# Patient Record
Sex: FEMALE | Race: WHITE | NOT HISPANIC OR LATINO | Employment: PART TIME | URBAN - NONMETROPOLITAN AREA
[De-identification: names, ages, dates, MRNs, and addresses within clinical notes are randomized per-mention and may not be internally consistent; named-entity substitution may affect disease eponyms.]

---

## 2024-03-31 ENCOUNTER — APPOINTMENT (EMERGENCY)
Dept: CT IMAGING | Facility: HOSPITAL | Age: 54
End: 2024-03-31
Payer: COMMERCIAL

## 2024-03-31 ENCOUNTER — HOSPITAL ENCOUNTER (EMERGENCY)
Facility: HOSPITAL | Age: 54
Discharge: HOME | End: 2024-03-31
Attending: EMERGENCY MEDICINE | Admitting: EMERGENCY MEDICINE
Payer: COMMERCIAL

## 2024-03-31 VITALS
HEART RATE: 93 BPM | RESPIRATION RATE: 16 BRPM | HEIGHT: 60 IN | OXYGEN SATURATION: 100 % | TEMPERATURE: 98.4 F | WEIGHT: 255 LBS | DIASTOLIC BLOOD PRESSURE: 64 MMHG | SYSTOLIC BLOOD PRESSURE: 142 MMHG | BODY MASS INDEX: 50.06 KG/M2

## 2024-03-31 DIAGNOSIS — N20.0 RENAL CALCULI: ICD-10-CM

## 2024-03-31 DIAGNOSIS — N39.0 UTI (URINARY TRACT INFECTION): Primary | ICD-10-CM

## 2024-03-31 LAB
ALBUMIN SERPL BCP-MCNC: 3.9 G/DL (ref 3.5–5)
ALP SERPL-CCNC: 42 U/L (ref 34–104)
ALT SERPL W P-5'-P-CCNC: 14 U/L (ref 7–52)
AMORPH PHOS CRY URNS QL MICRO: ABNORMAL /HPF
ANION GAP SERPL CALCULATED.3IONS-SCNC: 7 MMOL/L (ref 4–13)
AST SERPL W P-5'-P-CCNC: 12 U/L (ref 13–39)
BACTERIA UR QL AUTO: ABNORMAL /HPF
BASOPHILS # BLD AUTO: 0.03 THOUSANDS/ÂΜL (ref 0–0.1)
BASOPHILS NFR BLD AUTO: 0 % (ref 0–1)
BILIRUB SERPL-MCNC: 0.43 MG/DL (ref 0.2–1)
BILIRUB UR QL STRIP: ABNORMAL
BUN SERPL-MCNC: 16 MG/DL (ref 5–25)
CALCIUM SERPL-MCNC: 9.2 MG/DL (ref 8.4–10.2)
CHLORIDE SERPL-SCNC: 102 MMOL/L (ref 96–108)
CLARITY UR: ABNORMAL
CO2 SERPL-SCNC: 28 MMOL/L (ref 21–32)
COLOR UR: ABNORMAL
CREAT SERPL-MCNC: 0.65 MG/DL (ref 0.6–1.3)
EOSINOPHIL # BLD AUTO: 0.23 THOUSAND/ÂΜL (ref 0–0.61)
EOSINOPHIL NFR BLD AUTO: 3 % (ref 0–6)
ERYTHROCYTE [DISTWIDTH] IN BLOOD BY AUTOMATED COUNT: 13.5 % (ref 11.6–15.1)
GFR SERPL CREATININE-BSD FRML MDRD: 99 ML/MIN/1.73SQ M
GLUCOSE SERPL-MCNC: 98 MG/DL (ref 65–140)
GLUCOSE UR STRIP-MCNC: NEGATIVE MG/DL
HCT VFR BLD AUTO: 41.8 % (ref 34.8–46.1)
HGB BLD-MCNC: 14 G/DL (ref 11.5–15.4)
HGB UR QL STRIP.AUTO: ABNORMAL
IMM GRANULOCYTES # BLD AUTO: 0.04 THOUSAND/UL (ref 0–0.2)
IMM GRANULOCYTES NFR BLD AUTO: 1 % (ref 0–2)
KETONES UR STRIP-MCNC: NEGATIVE MG/DL
LEUKOCYTE ESTERASE UR QL STRIP: NEGATIVE
LIPASE SERPL-CCNC: 46 U/L (ref 11–82)
LYMPHOCYTES # BLD AUTO: 2.7 THOUSANDS/ÂΜL (ref 0.6–4.47)
LYMPHOCYTES NFR BLD AUTO: 31 % (ref 14–44)
MCH RBC QN AUTO: 30.2 PG (ref 26.8–34.3)
MCHC RBC AUTO-ENTMCNC: 33.5 G/DL (ref 31.4–37.4)
MCV RBC AUTO: 90 FL (ref 82–98)
MONOCYTES # BLD AUTO: 0.76 THOUSAND/ÂΜL (ref 0.17–1.22)
MONOCYTES NFR BLD AUTO: 9 % (ref 4–12)
NEUTROPHILS # BLD AUTO: 4.9 THOUSANDS/ÂΜL (ref 1.85–7.62)
NEUTS SEG NFR BLD AUTO: 56 % (ref 43–75)
NITRITE UR QL STRIP: POSITIVE
NON-SQ EPI CELLS URNS QL MICRO: ABNORMAL /HPF
NRBC BLD AUTO-RTO: 0 /100 WBCS
PH UR STRIP.AUTO: 6.5 [PH]
PLATELET # BLD AUTO: 266 THOUSANDS/UL (ref 149–390)
PMV BLD AUTO: 9 FL (ref 8.9–12.7)
POTASSIUM SERPL-SCNC: 4.3 MMOL/L (ref 3.5–5.3)
PROT SERPL-MCNC: 6.8 G/DL (ref 6.4–8.4)
PROT UR STRIP-MCNC: ABNORMAL MG/DL
RBC # BLD AUTO: 4.63 MILLION/UL (ref 3.81–5.12)
RBC #/AREA URNS AUTO: ABNORMAL /HPF
SODIUM SERPL-SCNC: 137 MMOL/L (ref 135–147)
SP GR UR STRIP.AUTO: >=1.03 (ref 1–1.03)
UROBILINOGEN UR QL STRIP.AUTO: 1 E.U./DL
WBC # BLD AUTO: 8.66 THOUSAND/UL (ref 4.31–10.16)
WBC #/AREA URNS AUTO: ABNORMAL /HPF

## 2024-03-31 PROCEDURE — 80053 COMPREHEN METABOLIC PANEL: CPT | Performed by: EMERGENCY MEDICINE

## 2024-03-31 PROCEDURE — 96366 THER/PROPH/DIAG IV INF ADDON: CPT

## 2024-03-31 PROCEDURE — 99284 EMERGENCY DEPT VISIT MOD MDM: CPT

## 2024-03-31 PROCEDURE — 81001 URINALYSIS AUTO W/SCOPE: CPT | Performed by: EMERGENCY MEDICINE

## 2024-03-31 PROCEDURE — 96365 THER/PROPH/DIAG IV INF INIT: CPT

## 2024-03-31 PROCEDURE — 36415 COLL VENOUS BLD VENIPUNCTURE: CPT | Performed by: EMERGENCY MEDICINE

## 2024-03-31 PROCEDURE — 83690 ASSAY OF LIPASE: CPT | Performed by: EMERGENCY MEDICINE

## 2024-03-31 PROCEDURE — 74176 CT ABD & PELVIS W/O CONTRAST: CPT

## 2024-03-31 PROCEDURE — 96375 TX/PRO/DX INJ NEW DRUG ADDON: CPT

## 2024-03-31 PROCEDURE — 85025 COMPLETE CBC W/AUTO DIFF WBC: CPT | Performed by: EMERGENCY MEDICINE

## 2024-03-31 PROCEDURE — 99285 EMERGENCY DEPT VISIT HI MDM: CPT | Performed by: EMERGENCY MEDICINE

## 2024-03-31 PROCEDURE — 87086 URINE CULTURE/COLONY COUNT: CPT | Performed by: EMERGENCY MEDICINE

## 2024-03-31 RX ORDER — CIPROFLOXACIN 500 MG/1
500 TABLET, FILM COATED ORAL 2 TIMES DAILY
Qty: 14 TABLET | Refills: 0 | Status: SHIPPED | OUTPATIENT
Start: 2024-03-31 | End: 2024-04-07

## 2024-03-31 RX ORDER — CIPROFLOXACIN 500 MG/1
500 TABLET, FILM COATED ORAL ONCE
Status: COMPLETED | OUTPATIENT
Start: 2024-03-31 | End: 2024-03-31

## 2024-03-31 RX ORDER — FENTANYL CITRATE 50 UG/ML
50 INJECTION, SOLUTION INTRAMUSCULAR; INTRAVENOUS ONCE
Status: COMPLETED | OUTPATIENT
Start: 2024-03-31 | End: 2024-03-31

## 2024-03-31 RX ADMIN — CIPROFLOXACIN 500 MG: 500 TABLET, FILM COATED ORAL at 03:25

## 2024-03-31 RX ADMIN — SODIUM CHLORIDE, SODIUM LACTATE, POTASSIUM CHLORIDE, AND CALCIUM CHLORIDE 1000 ML: .6; .31; .03; .02 INJECTION, SOLUTION INTRAVENOUS at 01:00

## 2024-03-31 RX ADMIN — FENTANYL CITRATE 50 MCG: 50 INJECTION INTRAMUSCULAR; INTRAVENOUS at 00:57

## 2024-03-31 NOTE — ED PROVIDER NOTES
History  Chief Complaint   Patient presents with    Blood in Urine     Blood in urine. Pt denies pain       History provided by:  Medical records and patient  Blood in Urine  This is a new problem. The current episode started today. The problem is unchanged. She describes the hematuria as gross hematuria. The hematuria occurs throughout her entire urinary stream. She reports no clotting in her urine stream. Her pain is at a severity of 3/10. The pain is mild. She describes her urine color as tea-colored. Irritative symptoms do not include frequency, nocturia or urgency. Associated symptoms include abdominal pain, bladder pain and flank pain. Pertinent negatives include no bone pain, chills, dysuria, fever, genital pain, hesitancy, inability to urinate, nausea, urinary retention, vomiting or weight loss. She is not sexually active.       None       Past Medical History:   Diagnosis Date    Asthma     Hypertension        History reviewed. No pertinent surgical history.    History reviewed. No pertinent family history.  I have reviewed and agree with the history as documented.    E-Cigarette/Vaping     E-Cigarette/Vaping Substances     Social History     Tobacco Use    Smoking status: Never    Smokeless tobacco: Never   Substance Use Topics    Alcohol use: Never    Drug use: Never       Review of Systems   Constitutional:  Negative for chills, fatigue, fever and weight loss.   HENT:  Negative for ear discharge, ear pain, rhinorrhea and sore throat.    Eyes:  Negative for pain and visual disturbance.   Respiratory:  Negative for cough and shortness of breath.    Cardiovascular:  Negative for chest pain and palpitations.   Gastrointestinal:  Positive for abdominal pain. Negative for diarrhea, nausea and vomiting.   Endocrine: Negative for polydipsia, polyphagia and polyuria.   Genitourinary:  Positive for difficulty urinating, flank pain and hematuria. Negative for decreased urine volume, dyspareunia, dysuria, enuresis,  frequency, genital sores, hesitancy, menstrual problem, nocturia, pelvic pain, urgency, vaginal bleeding, vaginal discharge and vaginal pain.   Musculoskeletal:  Negative for arthralgias and back pain.   Skin:  Negative for color change and rash.   Allergic/Immunologic: Negative for immunocompromised state.   Neurological:  Negative for dizziness, seizures, syncope, weakness and headaches.   Psychiatric/Behavioral:  Negative for confusion and self-injury. The patient is not nervous/anxious.    All other systems reviewed and are negative.      Physical Exam  Physical Exam  Vitals and nursing note reviewed.   Constitutional:       General: She is not in acute distress.     Appearance: Normal appearance. She is not ill-appearing, toxic-appearing or diaphoretic.   HENT:      Head: Normocephalic and atraumatic.      Nose: Nose normal. No congestion or rhinorrhea.      Mouth/Throat:      Mouth: Mucous membranes are moist.      Pharynx: Oropharynx is clear. No oropharyngeal exudate or posterior oropharyngeal erythema.   Eyes:      General:         Right eye: No discharge.         Left eye: No discharge.   Cardiovascular:      Rate and Rhythm: Normal rate and regular rhythm.      Pulses: Normal pulses.      Heart sounds: Normal heart sounds. No murmur heard.     No gallop.   Pulmonary:      Effort: Pulmonary effort is normal. No respiratory distress.      Breath sounds: Normal breath sounds. No stridor. No wheezing, rhonchi or rales.   Chest:      Chest wall: No tenderness.   Abdominal:      General: Bowel sounds are normal. There is no distension.      Palpations: Abdomen is soft. There is no mass.      Tenderness: There is no abdominal tenderness. There is right CVA tenderness and left CVA tenderness. There is no guarding or rebound.      Hernia: No hernia is present.   Musculoskeletal:         General: Normal range of motion.      Cervical back: Normal range of motion and neck supple.   Skin:     General: Skin is warm  and dry.      Capillary Refill: Capillary refill takes less than 2 seconds.   Neurological:      General: No focal deficit present.      Mental Status: She is alert and oriented to person, place, and time.      Cranial Nerves: No cranial nerve deficit.      Sensory: No sensory deficit.      Motor: No weakness.      Coordination: Coordination normal.      Gait: Gait normal.      Deep Tendon Reflexes: Reflexes normal.   Psychiatric:         Mood and Affect: Mood normal.         Behavior: Behavior normal.         Thought Content: Thought content normal.         Judgment: Judgment normal.         Vital Signs  ED Triage Vitals [03/31/24 0033]   Temperature Pulse Respirations Blood Pressure SpO2   98.4 °F (36.9 °C) 82 16 141/67 98 %      Temp Source Heart Rate Source Patient Position - Orthostatic VS BP Location FiO2 (%)   Temporal Monitor Sitting Left arm --      Pain Score       No Pain           Vitals:    03/31/24 0033 03/31/24 0100 03/31/24 0200 03/31/24 0322   BP: 141/67 127/59 142/64 142/64   Pulse: 82  75 93   Patient Position - Orthostatic VS: Sitting            Visual Acuity      ED Medications  Medications   lactated ringers bolus 1,000 mL (0 mL Intravenous Stopped 3/31/24 0324)   fentaNYL injection 50 mcg (50 mcg Intravenous Given 3/31/24 0057)   ciprofloxacin (CIPRO) tablet 500 mg (500 mg Oral Given 3/31/24 0325)       Diagnostic Studies  Results Reviewed       Procedure Component Value Units Date/Time    Comprehensive metabolic panel [[id number]]  (Abnormal) Collected: 03/31/24 0059    Lab Status: Final result Specimen: Blood from Arm, Left Updated: 03/31/24 0125     Sodium 137 mmol/L      Potassium 4.3 mmol/L      Chloride 102 mmol/L      CO2 28 mmol/L      ANION GAP 7 mmol/L      BUN 16 mg/dL      Creatinine 0.65 mg/dL      Glucose 98 mg/dL      Calcium 9.2 mg/dL      AST 12 U/L      ALT 14 U/L      Alkaline Phosphatase 42 U/L      Total Protein 6.8 g/dL      Albumin 3.9 g/dL      Total Bilirubin 0.43 mg/dL       eGFR 99 ml/min/1.73sq m     Narrative:      National Kidney Disease Foundation guidelines for Chronic Kidney Disease (CKD):     Stage 1 with normal or high GFR (GFR > 90 mL/min/1.73 square meters)    Stage 2 Mild CKD (GFR = 60-89 mL/min/1.73 square meters)    Stage 3A Moderate CKD (GFR = 45-59 mL/min/1.73 square meters)    Stage 3B Moderate CKD (GFR = 30-44 mL/min/1.73 square meters)    Stage 4 Severe CKD (GFR = 15-29 mL/min/1.73 square meters)    Stage 5 End Stage CKD (GFR <15 mL/min/1.73 square meters)  Note: GFR calculation is accurate only with a steady state creatinine    Lipase [[id number]]  (Normal) Collected: 03/31/24 0059    Lab Status: Final result Specimen: Blood from Arm, Left Updated: 03/31/24 0125     Lipase 46 u/L     Urine Microscopic [[id number]]  (Abnormal) Collected: 03/31/24 0102    Lab Status: Final result Specimen: Urine, Clean Catch Updated: 03/31/24 0116     RBC, UA Innumerable /hpf      WBC, UA       Field obscured, unable to enumerate     /hpf     Epithelial Cells       Field obscured, unable to enumerate     /hpf     Bacteria, UA       Field obscured, unable to enumerate     /hpf     AMORPH PHOSPATES Moderate /hpf     Urine culture [[id number]] Collected: 03/31/24 0102    Lab Status: In process Specimen: Urine, Clean Catch Updated: 03/31/24 0115    UA w Reflex to Microscopic w Reflex to Culture [[id number]]  (Abnormal) Collected: 03/31/24 0102    Lab Status: Final result Specimen: Urine, Clean Catch Updated: 03/31/24 0109     Color, UA Brown     Clarity, UA Cloudy     Specific Gravity, UA >=1.030     pH, UA 6.5     Leukocytes, UA Negative     Nitrite, UA Positive     Protein,  (2+) mg/dl      Glucose, UA Negative mg/dl      Ketones, UA Negative mg/dl      Urobilinogen, UA 1.0 E.U./dl      Bilirubin, UA Moderate     Occult Blood, UA Large    CBC and differential [[id number]] Collected: 03/31/24 0059    Lab Status: Final result Specimen: Blood from Arm, Left Updated: 03/31/24 0107      WBC 8.66 Thousand/uL      RBC 4.63 Million/uL      Hemoglobin 14.0 g/dL      Hematocrit 41.8 %      MCV 90 fL      MCH 30.2 pg      MCHC 33.5 g/dL      RDW 13.5 %      MPV 9.0 fL      Platelets 266 Thousands/uL      nRBC 0 /100 WBCs      Neutrophils Relative 56 %      Immature Grans % 1 %      Lymphocytes Relative 31 %      Monocytes Relative 9 %      Eosinophils Relative 3 %      Basophils Relative 0 %      Neutrophils Absolute 4.90 Thousands/µL      Absolute Immature Grans 0.04 Thousand/uL      Absolute Lymphocytes 2.70 Thousands/µL      Absolute Monocytes 0.76 Thousand/µL      Eosinophils Absolute 0.23 Thousand/µL      Basophils Absolute 0.03 Thousands/µL                    CT renal stone study abdomen pelvis wo contrast   Final Result by Maxine Hurtado MD (03/31 0306)      8 mm stone within the left renal pelvis. No hydronephrosis.      6.3 cm complex cystic lesion at the right kidney, likely reflecting a Bosniak 2 cyst. Initial evaluation with nonemergent renal ultrasound recommended to exclude underlying soft tissue component.         Workstation performed: [id number]                    Procedures  Procedures         ED Course                               SBIRT 20yo+      Flowsheet Row Most Recent Value   Initial Alcohol Screen: US AUDIT-C     1. How often do you have a drink containing alcohol? 0 Filed at: 03/31/2024 0033   2. How many drinks containing alcohol do you have on a typical day you are drinking?  0 Filed at: 03/31/2024 0033   3a. Male UNDER 65: How often do you have five or more drinks on one occasion? 0 Filed at: 03/31/2024 0033   3b. FEMALE Any Age, or MALE 65+: How often do you have 4 or more drinks on one occassion? 0 Filed at: 03/31/2024 0033   Audit-C Score 0 Filed at: 03/31/2024 0033   JOE: How many times in the past year have you...    Used an illegal drug or used a prescription medication for non-medical reasons? Never Filed at: 03/31/2024 0033                      Medical Decision  Making  0032:  Pt appears well, VS reviewed.  Bilateral flank pain and hematuria.  Plan to complete basic labs including urinalysis.  Check CT abd pelvis.  I will rehydrate with IVFs, give analgesics and reeval.    0230: CT and labs reviewed.  Pain well-controlled.  Stable for discharge.  Follow-up with urology closely given the large left renal pelvis stone.  Urinalysis with evidence of UTI, placed on antibiotics.    Amount and/or Complexity of Data Reviewed  Labs: ordered.  Radiology: ordered.     Details: CT abd pelvis--left renal pelvis calculi 8 mm, no hydronephrosis    Risk  Prescription drug management.             Disposition  Final diagnoses:   UTI (urinary tract infection)   Renal calculi     Time reflects when diagnosis was documented in both MDM as applicable and the Disposition within this note       Time User Action Codes Description Comment    3/31/2024  3:14 AM Blaze Prasad Add [N39.0] UTI (urinary tract infection)     3/31/2024  3:14 AM Blaze Prasad Add [N20.0] Renal calculi           ED Disposition       ED Disposition   Discharge    Condition   Stable    Date/Time   Sun Mar 31, 2024 0314    Comment   Angi Gomes discharge to home/self care.                   Follow-up Information       Follow up With Specialties Details Why Contact Info Additional Information    [facility] Schedule an appointment as soon as possible for a visit   [address] [place] [address] [address]  [address] [place] [id number] [phone] [facility] [address], [address]nd Floor, [place], [address] [phone]            Discharge Medication List as of 3/31/2024  3:15 AM        START taking these medications    Details   ciprofloxacin (CIPRO) 500 mg tablet Take 1 tablet (500 mg total) by mouth 2 (two) times a day for 7 days, Starting Sun 3/31/2024, Until Sun 4/7/2024, Print             No discharge procedures on  file.    PDMP Review       None            ED Provider  Electronically Signed by             Blaze Prasad MD  03/31/24 0514

## 2024-03-31 NOTE — Clinical Note
Angi Gomes was seen and treated in our emergency department on 3/31/2024.                Diagnosis:     Angi  may return to work on return date.    She may return on this date: 04/02/2024         If you have any questions or concerns, please don't hesitate to call.      Blaze Prasad MD    ______________________________           _______________          _______________  Hospital Representative                              Date                                Time Never smoker

## 2024-04-01 ENCOUNTER — TELEPHONE (OUTPATIENT)
Age: 54
End: 2024-04-01

## 2024-04-01 NOTE — TELEPHONE ENCOUNTER
VM left for pt to call us back if she needs a different appt date and time. Also encouraged to call with any issues, concerns.  Nothing else needed at this time.

## 2024-04-01 NOTE — TELEPHONE ENCOUNTER
New Patient    What is the reason for the patient’s appointment?: ED f/u UTI and kidney stones     What office location does the patient prefer?: [facility]    Does patient have Imaging/Lab Results: CT and labs 3/31/24 [facility] ED     Have patient records been requested?: records in epic   If No, are the records showing in Epic:       INSURANCE:   Do we accept the patient's insurance or is the patient Self-Pay?:    Insurance Provider: [employer]  Plan Type/Number:   Member ID#: [id number]       HISTORY:   Has the patient had any previous Urologist(s)?: no     Was the patient seen in the ED?: yes 3/31/24     Has the patient had any outside testing done?: no     Does the patient have a personal history of cancer?: no     Pt call [phone]

## 2024-04-01 NOTE — TELEPHONE ENCOUNTER
Left message for patient that she is scheduled right now on 4/17/24 at 1130 and to call back and confirm    Will keep her in the in basket to see if we get any cancellations sooner    Sending message to clinical as an FYI

## 2024-04-02 LAB — BACTERIA UR CULT: NORMAL

## 2024-04-04 NOTE — TELEPHONE ENCOUNTER
Patient called in, experiencing left flank pain that she believes to be related to stone. Appt not until 4/17. Has been taking ibuprofen, still rating pain about a 6 making it hard to get through work day. Requesting something for pain. [facility] [place].    CT 3/31   IMPRESSION:     8 mm stone within the left renal pelvis. No hydronephrosis.     6.3 cm complex cystic lesion at the right kidney, likely reflecting a Bosniak 2 cyst. Initial evaluation with nonemergent renal ultrasound recommended to exclude underlying soft tissue component.    # [phone]

## 2024-04-05 NOTE — TELEPHONE ENCOUNTER
Spoke with pt, she is having intermittent pain to b/l flank area. Took 200 mg po of ibuprofen every 12 hours yesterday. Did explain she could increase that dose up to 800mg po tid with food and to use a heating pad to back and abdomen through out the day. Increase water intake. Advised of ER protocol. She will try the increase in ibuprofen at this time as she does not want to take any narcotics if possible. She will call back with any further questions or concerns. Nothing else needed at this time.

## 2024-04-12 RX ORDER — CETIRIZINE HYDROCHLORIDE 10 MG/1
10 TABLET, CHEWABLE ORAL DAILY
COMMUNITY

## 2024-04-12 RX ORDER — GINSENG 100 MG
50 CAPSULE ORAL DAILY
COMMUNITY

## 2024-04-12 RX ORDER — IPRATROPIUM BROMIDE AND ALBUTEROL SULFATE 2.5; .5 MG/3ML; MG/3ML
3 SOLUTION RESPIRATORY (INHALATION) 4 TIMES DAILY PRN
COMMUNITY
Start: 2024-02-07 | End: 2025-02-06

## 2024-04-12 RX ORDER — ALBUTEROL SULFATE 2.5 MG/3ML
2.5 SOLUTION RESPIRATORY (INHALATION) EVERY 4 HOURS PRN
COMMUNITY
Start: 2023-12-26 | End: 2024-12-25

## 2024-04-12 RX ORDER — MONTELUKAST SODIUM 10 MG/1
10 TABLET ORAL
COMMUNITY
Start: 2024-03-21 | End: 2025-03-21

## 2024-04-12 RX ORDER — LOSARTAN POTASSIUM 25 MG/1
25 TABLET ORAL DAILY
COMMUNITY
Start: 2024-03-20

## 2024-04-12 RX ORDER — FLUTICASONE PROPIONATE AND SALMETEROL 250; 50 UG/1; UG/1
1 POWDER RESPIRATORY (INHALATION) 2 TIMES DAILY
COMMUNITY
Start: 2023-10-19

## 2024-04-12 RX ORDER — LORAZEPAM 0.5 MG/1
0.5 TABLET ORAL DAILY PRN
COMMUNITY
Start: 2024-01-25

## 2024-04-15 ENCOUNTER — NURSE TRIAGE (OUTPATIENT)
Age: 54
End: 2024-04-15

## 2024-04-15 ENCOUNTER — LAB (OUTPATIENT)
Dept: LAB | Facility: HOSPITAL | Age: 54
End: 2024-04-15
Payer: COMMERCIAL

## 2024-04-15 DIAGNOSIS — R39.9 UTI SYMPTOMS: Primary | ICD-10-CM

## 2024-04-15 DIAGNOSIS — R39.9 UTI SYMPTOMS: ICD-10-CM

## 2024-04-15 LAB
BACTERIA UR QL AUTO: ABNORMAL /HPF
BILIRUB UR QL STRIP: ABNORMAL
CAOX CRY URNS QL MICRO: ABNORMAL /HPF
CLARITY UR: ABNORMAL
COLOR UR: ABNORMAL
GLUCOSE UR STRIP-MCNC: NEGATIVE MG/DL
HGB UR QL STRIP.AUTO: ABNORMAL
KETONES UR STRIP-MCNC: NEGATIVE MG/DL
LEUKOCYTE ESTERASE UR QL STRIP: ABNORMAL
MUCOUS THREADS UR QL AUTO: ABNORMAL
NITRITE UR QL STRIP: NEGATIVE
NON-SQ EPI CELLS URNS QL MICRO: ABNORMAL /HPF
PH UR STRIP.AUTO: 6 [PH]
PROT UR STRIP-MCNC: ABNORMAL MG/DL
RBC #/AREA URNS AUTO: ABNORMAL /HPF
SP GR UR STRIP.AUTO: >=1.03 (ref 1–1.03)
UROBILINOGEN UR QL STRIP.AUTO: 0.2 E.U./DL
WBC #/AREA URNS AUTO: ABNORMAL /HPF

## 2024-04-15 PROCEDURE — 81001 URINALYSIS AUTO W/SCOPE: CPT

## 2024-04-15 PROCEDURE — 87086 URINE CULTURE/COLONY COUNT: CPT

## 2024-04-15 NOTE — TELEPHONE ENCOUNTER
Patient called in r/t frequency and burning with void. Denies fever, pain, hematuria. Patient had UTI back in March and took a course of cipro. Ordered urine testing. Reviewed ED precautions.         Answer Questions - Initial Assessment  When did your symptoms start: Today    Is burning with every void: yes    Do you have any other urinary symptoms, urinary frequency, urgency, incontinence: no      Have you become unable to urinate: No    Have you seen blood in your urine: no    Do you have any abdominal pain or flank pain: yes    Do you have a fever of 101 or higher: no    Do you have a history of urinary tract infections: Yes: @LASTLAB(MG)@    Have you had any recent urine testing: no      Have you had a recent urologic procedure or surgery: no    Protocols used: Dysuria

## 2024-04-16 PROBLEM — N28.1 COMPLEX RENAL CYST: Status: ACTIVE | Noted: 2024-04-16

## 2024-04-16 PROBLEM — N20.0 KIDNEY STONE: Status: ACTIVE | Noted: 2024-04-16

## 2024-04-16 LAB — BACTERIA UR CULT: NORMAL

## 2024-04-16 NOTE — TELEPHONE ENCOUNTER
Note in chart from Basil Coats due to patient is not established that her results can be reviewed by Dr. Jean at upcoming appointment tomorrow.

## 2024-04-16 NOTE — TELEPHONE ENCOUNTER
Patient called in for urine results, informed results may take up to 72 hours to result. States she will discuss this with Dr. Jean during appt. Tomorrow and hopefully they are in by that time.     Additional Information   Negative: The patient has a fever of 101 or higher   Negative: The patient has severe uncontrolled pain   Negative: The patient has persistent vomiting    Protocols used: Dysuria

## 2024-04-16 NOTE — RESULT ENCOUNTER NOTE
Patient has not yet been established with [facility] urology and has new patient appointment with Dr. Jean tomorrow.  I have never seen the patient before and orders were placed under my name as part of urology protocol.  Results will be forwarded to Dr. Jean as a FYI to review tomorrow at initial office visit.

## 2024-04-16 NOTE — PROGRESS NOTES
UROLOGY PROGRESS NOTE         NAME: Angi Gomes  AGE: 54 y.o. SEX: female  : 1970   MRN: [id number]    DATE: 2024  TIME: 4:22 PM    Assessment and Plan      Impression:   1. Kidney stone  -     Urine culture; Future  -     Urine culture  -     Ambulatory Referral to Urology; Future    2. Complex renal cyst  -     US kidney and bladder with pvr; Future; Expected date: 2024         Plan: Plan renal ultrasound for further evaluation of complex cyst right kidney and also recommended cystoscopy regarding gross hematuria.  The hematuria is most likely related to her left renal stone however she continues to have some dysuria..  Thus, I recommend that she have a cystoscopy done.  This could be done in the office sometime soon or with a stone procedure if she chooses to proceed.  I discussed with her options including ureteroscopy and laser lithotripsy of her renal stone versus ESWL with or without a stent.  Apparently she has been at [facility] previously with relatives and would like to be evaluated there for possible ESWL.  We discussed the pros and cons of both approaches.  It is her choice.  We will send the referral and she understands the need for cystoscopy regarding evaluation of the hematuria.  Her urine culture was negative thus I do not have a distinct source regarding the dysuria.  Her stone appears to be too large to pass at about 8 mm in greatest dimension.  She will let me know if she wants to go ahead with cystoscopy in the office.       Chief Complaint     Chief Complaint   Patient presents with   • New Patient Visit     Follow up for a kidney stone. Gave urine a few days ago and wanted to follow up on results.     History of Present Illness     HPI: Angi Gomes is a 54 y.o. year old female who presents with recent evaluation in the emergency room for potential UTI.  CT scan showed mildly complex cyst which requires a follow-up ultrasound to rule out a solid component and also a  renal stone 8 mm.  She has a history of hematuria and dysuria.  Presently comfortable but some intermittent dysuria.  No flank discomfort presently.  CT reviewed with the patient.  Previous culture negative done recently.              The following portions of the patient's history were reviewed and updated as appropriate: allergies, current medications, past family history, past medical history, past social history, past surgical history and problem list.  Past Medical History:   Diagnosis Date   • Anxiety    • Asthma    • GERD (gastroesophageal reflux disease)    • Hypertension    • IBS (irritable bowel syndrome)      Past Surgical History:   Procedure Laterality Date   •  SECTION     • CHOLECYSTECTOMY     • COMBINED HYSTERECTOMY ABDOMINAL W/ A&P REPAIR / OOPHORECTOMY     • NASAL SEPTUM SURGERY     • WISDOM TOOTH EXTRACTION       shoulder  Review of Systems     Const: Denies chills, fever and weight loss.  CV: Denies chest pain.  Resp: Denies SOB.  GI: Denies abdominal pain, nausea and vomiting.  : Denies symptoms other than stated above.  Musculo: Denies back pain.    Objective   /78   Pulse 78   Temp 97.9 °F (36.6 °C) (Tympanic)   Resp 18   Ht 5' (1.524 m)   Wt 117 kg (258 lb 12.8 oz)   SpO2 98%   BMI 50.54 kg/m²     Physical Exam  Const: Appears healthy and well developed. No signs of acute distress present.  Resp: Respirations are regular and unlabored.   CV: Rate is regular. Rhythm is regular.  Abdomen: Abdomen is soft, nontender, and nondistended. Kidneys are not palpable.  : Not performed  Psych: Patient's attitude is cooperative. Mood is normal. Affect is normal.    Procedure   Procedures     Current Medications     Current Outpatient Medications:   •  albuterol (2.5 mg/3 mL) 0.083 % nebulizer solution, Inhale 2.5 mg every 4 (four) hours as needed, Disp: , Rfl:   •  cetirizine (ZyrTEC) 10 MG chewable tablet, Chew 10 mg daily, Disp: , Rfl:   •  Cholecalciferol 50 MCG (2000) CAPS,  Take by mouth, Disp: , Rfl:   •  Fluticasone-Salmeterol (Advair) 250-50 mcg/dose inhaler, Inhale 1 puff 2 (two) times a day, Disp: , Rfl:   •  ipratropium-albuterol (DUO-NEB) 0.5-2.5 mg/3 mL nebulizer solution, Inhale 3 mL 4 (four) times a day as needed for wheezing, Disp: , Rfl:   •  LORazepam (ATIVAN) 0.5 mg tablet, Take 0.5 mg by mouth daily as needed, Disp: , Rfl:   •  losartan (COZAAR) 25 mg tablet, Take 25 mg by mouth daily, Disp: , Rfl:   •  montelukast (SINGULAIR) 10 mg tablet, Take 10 mg by mouth daily at bedtime, Disp: , Rfl:   •  Multiple Vitamin (MULTIVITAMIN ADULT PO), Take 1 tablet by mouth daily, Disp: , Rfl:   •  Ascorbic Acid, Vitamin C, (VITAMIN C) 100 MG tablet, Take 100 mg by mouth daily (Patient not taking: Reported on 4/17/2024), Disp: , Rfl:   •  Zinc 50 MG TABS, Take 50 mg by mouth in the morning (Patient not taking: Reported on 4/17/2024), Disp: , Rfl:         Yosi Jean MD

## 2024-04-17 ENCOUNTER — OFFICE VISIT (OUTPATIENT)
Dept: UROLOGY | Facility: CLINIC | Age: 54
End: 2024-04-17
Payer: COMMERCIAL

## 2024-04-17 VITALS
HEART RATE: 78 BPM | SYSTOLIC BLOOD PRESSURE: 136 MMHG | HEIGHT: 60 IN | TEMPERATURE: 97.9 F | RESPIRATION RATE: 18 BRPM | WEIGHT: 258.8 LBS | DIASTOLIC BLOOD PRESSURE: 78 MMHG | BODY MASS INDEX: 50.81 KG/M2 | OXYGEN SATURATION: 98 %

## 2024-04-17 DIAGNOSIS — N20.0 KIDNEY STONE: Primary | ICD-10-CM

## 2024-04-17 DIAGNOSIS — N28.1 COMPLEX RENAL CYST: ICD-10-CM

## 2024-04-17 PROCEDURE — 99204 OFFICE O/P NEW MOD 45 MIN: CPT | Performed by: UROLOGY

## 2024-04-17 PROCEDURE — 87086 URINE CULTURE/COLONY COUNT: CPT | Performed by: UROLOGY

## 2024-04-17 NOTE — PATIENT INSTRUCTIONS
Please go to the emergency room if you have pain despite your pain medication, persistent nausea and vomiting, or fever.  Please let us know if you want to proceed with your cystoscopy in the office in regard to the blood in your urine and burning.  Recent urine culture was negative for infection

## 2024-04-19 LAB — BACTERIA UR CULT: NORMAL

## 2024-04-24 ENCOUNTER — HOSPITAL ENCOUNTER (OUTPATIENT)
Dept: ULTRASOUND IMAGING | Facility: HOSPITAL | Age: 54
Discharge: HOME | End: 2024-04-24
Attending: UROLOGY
Payer: COMMERCIAL

## 2024-04-24 DIAGNOSIS — N28.1 COMPLEX RENAL CYST: ICD-10-CM

## 2024-04-24 PROCEDURE — 76770 US EXAM ABDO BACK WALL COMP: CPT

## 2024-05-14 DIAGNOSIS — N28.1 COMPLEX RENAL CYST: Primary | ICD-10-CM

## 2024-05-15 ENCOUNTER — TELEPHONE (OUTPATIENT)
Dept: UROLOGY | Facility: CLINIC | Age: 54
End: 2024-05-15

## 2024-05-15 NOTE — TELEPHONE ENCOUNTER
Spoke with pt, made aware of Dr. Jean's message. She will call her insurance and ask if the test is covered. Then she will call central scheduling to set that up. She will call us back for a virtual or in office appt.   Verbalized understanding. Nothing else needed at this time.

## 2024-05-15 NOTE — TELEPHONE ENCOUNTER
----- Message from Donavan Jean MD sent at 5/14/2024 10:43 PM EDT -----  Patient has a complex renal cyst of right kidney which requires further evaluation please notify patient  recommend MRI of kidneys with and without IV contrast.  I ordered the study.  Patient will need follow-up to discuss

## 2024-07-20 ENCOUNTER — HOSPITAL ENCOUNTER (OUTPATIENT)
Dept: CT IMAGING | Facility: HOSPITAL | Age: 54
Discharge: HOME | End: 2024-07-20
Payer: COMMERCIAL

## 2024-07-20 ENCOUNTER — APPOINTMENT (OUTPATIENT)
Dept: LAB | Facility: HOSPITAL | Age: 54
End: 2024-07-20
Payer: COMMERCIAL

## 2024-07-20 DIAGNOSIS — N28.89 RENAL MASS: ICD-10-CM

## 2024-07-20 DIAGNOSIS — N28.1 CYST OF KIDNEY, ACQUIRED: ICD-10-CM

## 2024-07-20 DIAGNOSIS — N20.0 CALCULUS OF KIDNEY: ICD-10-CM

## 2024-07-20 LAB
ANION GAP SERPL CALCULATED.3IONS-SCNC: 6 MMOL/L (ref 4–13)
BUN SERPL-MCNC: 13 MG/DL (ref 5–25)
CALCIUM SERPL-MCNC: 9.4 MG/DL (ref 8.4–10.2)
CHLORIDE SERPL-SCNC: 104 MMOL/L (ref 96–108)
CO2 SERPL-SCNC: 29 MMOL/L (ref 21–32)
CREAT SERPL-MCNC: 0.55 MG/DL (ref 0.6–1.3)
GFR SERPL CREATININE-BSD FRML MDRD: 105 ML/MIN/1.73SQ M
GLUCOSE P FAST SERPL-MCNC: 91 MG/DL (ref 65–99)
POTASSIUM SERPL-SCNC: 4.4 MMOL/L (ref 3.5–5.3)
SODIUM SERPL-SCNC: 139 MMOL/L (ref 135–147)

## 2024-07-20 PROCEDURE — 80048 BASIC METABOLIC PNL TOTAL CA: CPT

## 2024-07-20 PROCEDURE — 74170 CT ABD WO CNTRST FLWD CNTRST: CPT

## 2024-07-20 PROCEDURE — 36415 COLL VENOUS BLD VENIPUNCTURE: CPT

## 2024-07-20 RX ADMIN — IOHEXOL 100 ML: 350 INJECTION, SOLUTION INTRAVENOUS at 09:59

## 2024-07-26 ENCOUNTER — TELEPHONE (OUTPATIENT)
Age: 54
End: 2024-07-26

## 2024-07-26 NOTE — TELEPHONE ENCOUNTER
Patient went to [facility] for second opinion and after seeing an MD there she would like to have the laser surgery and would like to discuss it with you. She also asked that you look at CT report from 7/20 mainly for the cyst on her kidney.    CB: [phone]

## 2024-07-26 NOTE — TELEPHONE ENCOUNTER
Called and spoke with patient, appointment scheduled 8/7/24 at 1130 with Dr. Jean for a phone visit

## 2024-08-04 RX ORDER — GABAPENTIN 100 MG/1
100 CAPSULE ORAL
COMMUNITY
Start: 2024-07-23

## 2024-08-04 RX ORDER — LIDOCAINE 50 MG/G
OINTMENT TOPICAL 2 TIMES DAILY PRN
COMMUNITY
Start: 2024-07-23 | End: 2025-07-23

## 2024-08-07 ENCOUNTER — TELEMEDICINE (OUTPATIENT)
Dept: UROLOGY | Facility: CLINIC | Age: 54
End: 2024-08-07
Payer: COMMERCIAL

## 2024-08-07 DIAGNOSIS — N28.1 COMPLEX RENAL CYST: Primary | ICD-10-CM

## 2024-08-07 DIAGNOSIS — N20.0 KIDNEY STONE: ICD-10-CM

## 2024-08-07 PROCEDURE — 99441 PR PHYS/QHP TELEPHONE EVALUATION 5-10 MIN: CPT | Performed by: UROLOGY

## 2024-08-07 RX ORDER — MAGNESIUM L-LACTATE 84 MG
84 TABLET, EXTENDED RELEASE ORAL AS NEEDED
COMMUNITY

## 2024-08-07 NOTE — PROGRESS NOTES
UROLOGY PROGRESS NOTE         NAME: Angi Gomes  AGE: 54 y.o. SEX: female  : 1970   MRN: [id number]    DATE: 2024  TIME: 12:18 PM    Assessment and Plan      Impression:   1. Complex renal cyst  2. Kidney stone       Plan: Discussed CT renal findings with patient.  Bosniak 2 cyst noted in right kidney.  No suspicious components.  Based on radiologist report no follow-up necessary.  Regarding 7 mm left lower pole renal stone, she has options of ureteroscopy with laser lithotripsy or ESWL or continued expectant management.  She saw Dr. Angeles for opinion.  She wants to manage things expectantly.  The stone is presently in a position where a portion of it in the lower pole is likely inaccessible ureteroscopically.  It is also in a position where ESWL may result in retained fragments in the lower pole calyx.  We discussed pros and cons of each and she prefers watchful waiting.  Will see her back with a KUB in 6 months and we discussed stone prevention.  Her questions were answered.  I also noted that she has follow-up at [facility] and should keep that visit for their opinion on things.      Chief Complaint   No chief complaint on file.    History of Present Illness     HPI: Angi Gomes is a 54 y.o. year old female who presents with history of a complex renal cyst and a 7 to 8 mm nonobstructing renal stone.  She was seen for an opinion at [facility] with Dr. Angeles.  A CT renal protocol was recommended and the patient is here for phone call discussion and review of that study and recommendations.  CT renal protocol revealed a Bosniak 2 cyst with no suspicious components.  Based on [employer] recommendations and the fact that this is a Bosniak 2 cyst, no further follow-up was recommended in regard to future imaging for this.    This was a phone call visit today.  Patient is at work in the [facility] [employer] I am in the office behind close doors for patient confidentiality.  Patient was  identified and I received permission to treat by phone.  Patient understands limitations of a phone visit a total of 10 minutes were spent in the phone call visit.          The following portions of the patient's history were reviewed and updated as appropriate: allergies, current medications, past family history, past medical history, past social history, past surgical history and problem list.  Past Medical History:   Diagnosis Date   • Anxiety    • Asthma    • GERD (gastroesophageal reflux disease)    • Hypertension    • IBS (irritable bowel syndrome)      Past Surgical History:   Procedure Laterality Date   •  SECTION     • CHOLECYSTECTOMY     • COMBINED HYSTERECTOMY ABDOMINAL W/ A&P REPAIR / OOPHORECTOMY     • NASAL SEPTUM SURGERY     • WISDOM TOOTH EXTRACTION       shoulder  Review of Systems     Const: Denies chills, fever and weight loss.  CV: Denies chest pain.  Resp: Denies SOB.  GI: Denies abdominal pain, nausea and vomiting.  : Denies symptoms other than stated above.  Musculo: Denies back pain.    Objective   There were no vitals taken for this visit.    Procedure   Procedures     Current Medications     Current Outpatient Medications:   •  albuterol (2.5 mg/3 mL) 0.083 % nebulizer solution, Inhale 2.5 mg every 4 (four) hours as needed, Disp: , Rfl:   •  B Complex Vitamins (VITAMIN B COMPLEX PO), Start: 7/10/24 11:23:00 AM EDT, Disp: , Rfl:   •  cetirizine (ZyrTEC) 10 MG chewable tablet, Chew 10 mg daily, Disp: , Rfl:   •  Cholecalciferol 50 MCG (2000 UT) CAPS, Take by mouth, Disp: , Rfl:   •  Fluticasone-Salmeterol (Advair) 250-50 mcg/dose inhaler, Inhale 1 puff 2 (two) times a day, Disp: , Rfl:   •  gabapentin (NEURONTIN) 100 mg capsule, Take 100 mg by mouth daily at bedtime, Disp: , Rfl:   •  ipratropium-albuterol (DUO-NEB) 0.5-2.5 mg/3 mL nebulizer solution, Inhale 3 mL 4 (four) times a day as needed for wheezing, Disp: , Rfl:   •  lidocaine (XYLOCAINE) 5 % ointment, Apply topically 2  (two) times a day as needed, Disp: , Rfl:   •  LORazepam (ATIVAN) 0.5 mg tablet, Take 0.5 mg by mouth daily as needed, Disp: , Rfl:   •  losartan (COZAAR) 25 mg tablet, Take 25 mg by mouth daily, Disp: , Rfl:   •  magnesium (MAGTAB) 84 MG (7MEQ) TBCR, Take 84 mg by mouth if needed, Disp: , Rfl:   •  montelukast (SINGULAIR) 10 mg tablet, Take 10 mg by mouth daily at bedtime, Disp: , Rfl:   •  Multiple Vitamin (MULTIVITAMIN ADULT PO), Take 1 tablet by mouth daily, Disp: , Rfl:   •  Ascorbic Acid, Vitamin C, (VITAMIN C) 100 MG tablet, Take 100 mg by mouth daily (Patient not taking: Reported on 4/17/2024), Disp: , Rfl:   •  Multiple Vitamins-Minerals (DAILY MULTIVITAMIN PO), in the morning (Patient not taking: Reported on 8/7/2024), Disp: , Rfl:   •  Zinc 50 MG TABS, Take 50 mg by mouth in the morning (Patient not taking: Reported on 4/17/2024), Disp: , Rfl:         Yosi Jean MD